# Patient Record
Sex: FEMALE | Race: BLACK OR AFRICAN AMERICAN | NOT HISPANIC OR LATINO | Employment: UNEMPLOYED | URBAN - METROPOLITAN AREA
[De-identification: names, ages, dates, MRNs, and addresses within clinical notes are randomized per-mention and may not be internally consistent; named-entity substitution may affect disease eponyms.]

---

## 2022-05-11 ENCOUNTER — HOSPITAL ENCOUNTER (EMERGENCY)
Age: 22
Discharge: HOME | End: 2022-05-11

## 2022-05-11 ENCOUNTER — APPOINTMENT (OUTPATIENT)
Dept: GENERAL RADIOLOGY | Age: 22
End: 2022-05-11

## 2022-05-11 VITALS
DIASTOLIC BLOOD PRESSURE: 102 MMHG | SYSTOLIC BLOOD PRESSURE: 161 MMHG | OXYGEN SATURATION: 98 % | TEMPERATURE: 98.4 F | RESPIRATION RATE: 16 BRPM | HEART RATE: 83 BPM

## 2022-05-11 PROCEDURE — 10003627 HB COUNTER ED NO SERVICE

## 2022-05-11 PROCEDURE — 10004651 HB RX, NO CHARGE ITEM: Performed by: PHYSICIAN ASSISTANT

## 2022-05-11 PROCEDURE — 73562 X-RAY EXAM OF KNEE 3: CPT

## 2022-05-11 RX ORDER — ACETAMINOPHEN 500 MG
1000 TABLET ORAL ONCE
Status: COMPLETED | OUTPATIENT
Start: 2022-05-11 | End: 2022-05-11

## 2022-05-11 RX ADMIN — ACETAMINOPHEN 1000 MG: 500 TABLET ORAL at 12:30

## 2022-05-11 ASSESSMENT — PAIN SCALES - GENERAL: PAINLEVEL_OUTOF10: 8

## 2023-08-24 ENCOUNTER — HOSPITAL ENCOUNTER (EMERGENCY)
Age: 23
Discharge: HOME | End: 2023-08-25
Attending: EMERGENCY MEDICINE
Payer: MEDICAID

## 2023-08-24 VITALS
OXYGEN SATURATION: 99 % | HEIGHT: 72 IN | SYSTOLIC BLOOD PRESSURE: 165 MMHG | BODY MASS INDEX: 39.68 KG/M2 | DIASTOLIC BLOOD PRESSURE: 99 MMHG | RESPIRATION RATE: 17 BRPM | HEART RATE: 76 BPM | TEMPERATURE: 98 F | WEIGHT: 293 LBS

## 2023-08-24 DIAGNOSIS — R03.0 ELEVATED BLOOD PRESSURE READING: Primary | ICD-10-CM

## 2023-08-24 DIAGNOSIS — B96.89 BACTERIAL CONJUNCTIVITIS OF BOTH EYES: ICD-10-CM

## 2023-08-24 DIAGNOSIS — H10.9 BACTERIAL CONJUNCTIVITIS OF BOTH EYES: ICD-10-CM

## 2023-08-24 PROCEDURE — 99283 EMERGENCY DEPT VISIT LOW MDM: CPT

## 2023-08-25 RX ORDER — GENTAMICIN SULFATE 3 MG/G
1 OINTMENT OPHTHALMIC 3 TIMES DAILY
Qty: 3.5 G | Refills: 0 | Status: SHIPPED | OUTPATIENT
Start: 2023-08-25

## 2023-08-25 RX ORDER — AMLODIPINE BESYLATE 10 MG/1
10 TABLET ORAL DAILY
Qty: 30 TABLET | Refills: 1 | Status: SHIPPED | OUTPATIENT
Start: 2023-08-25 | End: 2023-09-24

## 2023-08-25 NOTE — DISCHARGE INSTRUCTIONS
Please restart your amlodipine. See a primary care physician within one week to have your blood pressure rechecked. Please use the ointment as prescribed. Please keep good hand hygiene to stop the spread of the infection.